# Patient Record
Sex: MALE | Race: WHITE | Employment: OTHER | URBAN - METROPOLITAN AREA
[De-identification: names, ages, dates, MRNs, and addresses within clinical notes are randomized per-mention and may not be internally consistent; named-entity substitution may affect disease eponyms.]

---

## 2017-01-11 ENCOUNTER — NURSE ONLY (OUTPATIENT)
Dept: FAMILY MEDICINE CLINIC | Age: 82
End: 2017-01-11

## 2017-01-11 DIAGNOSIS — E53.8 B12 DEFICIENCY: Primary | ICD-10-CM

## 2017-01-11 PROCEDURE — 96372 THER/PROPH/DIAG INJ SC/IM: CPT | Performed by: FAMILY MEDICINE

## 2017-01-11 RX ORDER — CYANOCOBALAMIN 1000 UG/ML
1000 INJECTION INTRAMUSCULAR; SUBCUTANEOUS ONCE
Status: COMPLETED | OUTPATIENT
Start: 2017-01-11 | End: 2017-01-11

## 2017-01-11 RX ADMIN — CYANOCOBALAMIN 1000 MCG: 1000 INJECTION INTRAMUSCULAR; SUBCUTANEOUS at 14:22

## 2017-01-27 ENCOUNTER — HOSPITAL ENCOUNTER (OUTPATIENT)
Dept: CT IMAGING | Age: 82
Discharge: HOME | End: 2017-01-27
Payer: MEDICARE

## 2017-01-27 ENCOUNTER — HOSPITAL ENCOUNTER (OUTPATIENT)
Dept: ULTRASOUND IMAGING | Age: 82
Discharge: HOME | End: 2017-01-27
Payer: MEDICARE

## 2017-01-27 DIAGNOSIS — F01.50 VASCULAR DEMENTIA WITHOUT BEHAVIORAL DISTURBANCE (HCC): ICD-10-CM

## 2017-01-27 DIAGNOSIS — I65.29 STENOSIS OF CAROTID ARTERY, UNSPECIFIED LATERALITY: Primary | ICD-10-CM

## 2017-01-27 DIAGNOSIS — I77.9 CAROTID DISEASE, BILATERAL (HCC): ICD-10-CM

## 2017-01-27 DIAGNOSIS — R53.1 RIGHT SIDED WEAKNESS: ICD-10-CM

## 2017-01-27 PROCEDURE — 70450 CT HEAD/BRAIN W/O DYE: CPT

## 2017-01-27 PROCEDURE — 93880 EXTRACRANIAL BILAT STUDY: CPT

## 2017-02-07 ENCOUNTER — NURSE ONLY (OUTPATIENT)
Dept: FAMILY MEDICINE CLINIC | Age: 82
End: 2017-02-07

## 2017-02-07 DIAGNOSIS — E53.8 B12 DEFICIENCY: Primary | ICD-10-CM

## 2017-02-07 PROCEDURE — 96372 THER/PROPH/DIAG INJ SC/IM: CPT | Performed by: FAMILY MEDICINE

## 2017-02-07 RX ORDER — CYANOCOBALAMIN 1000 UG/ML
1000 INJECTION INTRAMUSCULAR; SUBCUTANEOUS ONCE
Status: COMPLETED | OUTPATIENT
Start: 2017-02-07 | End: 2017-02-07

## 2017-02-07 RX ADMIN — CYANOCOBALAMIN 1000 MCG: 1000 INJECTION INTRAMUSCULAR; SUBCUTANEOUS at 13:21

## 2017-02-10 RX ORDER — VALSARTAN AND HYDROCHLOROTHIAZIDE 160; 12.5 MG/1; MG/1
TABLET, FILM COATED ORAL
Qty: 90 TABLET | Refills: 1 | Status: SHIPPED | OUTPATIENT
Start: 2017-02-10 | End: 2017-08-09 | Stop reason: SDUPTHER

## 2017-02-10 RX ORDER — CARVEDILOL 6.25 MG/1
TABLET ORAL
Qty: 180 TABLET | Refills: 1 | Status: SHIPPED | OUTPATIENT
Start: 2017-02-10 | End: 2017-08-09 | Stop reason: SDUPTHER

## 2017-02-27 RX ORDER — ALLOPURINOL 100 MG/1
TABLET ORAL
Qty: 180 TABLET | Refills: 1 | Status: SHIPPED | OUTPATIENT
Start: 2017-02-27 | End: 2017-08-26 | Stop reason: SDUPTHER

## 2017-03-13 ENCOUNTER — NURSE ONLY (OUTPATIENT)
Dept: FAMILY MEDICINE CLINIC | Age: 82
End: 2017-03-13

## 2017-03-13 DIAGNOSIS — E53.8 B12 DEFICIENCY: Primary | ICD-10-CM

## 2017-03-13 PROCEDURE — 96372 THER/PROPH/DIAG INJ SC/IM: CPT | Performed by: FAMILY MEDICINE

## 2017-03-13 RX ORDER — CYANOCOBALAMIN 1000 UG/ML
1000 INJECTION INTRAMUSCULAR; SUBCUTANEOUS ONCE
Status: COMPLETED | OUTPATIENT
Start: 2017-03-13 | End: 2017-03-13

## 2017-03-13 RX ADMIN — CYANOCOBALAMIN 1000 MCG: 1000 INJECTION INTRAMUSCULAR; SUBCUTANEOUS at 11:42

## 2017-04-21 RX ORDER — FINASTERIDE 5 MG/1
TABLET, FILM COATED ORAL
Qty: 90 TABLET | Refills: 0 | Status: SHIPPED | OUTPATIENT
Start: 2017-04-21 | End: 2017-07-20 | Stop reason: SDUPTHER

## 2017-05-31 LAB — PROSTATE SPECIFIC ANTIGEN: 7.36 NG/ML (ref 0–6.22)

## 2017-06-01 ENCOUNTER — OFFICE VISIT (OUTPATIENT)
Dept: UROLOGY | Age: 82
End: 2017-06-01

## 2017-06-01 VITALS
SYSTOLIC BLOOD PRESSURE: 138 MMHG | BODY MASS INDEX: 22.44 KG/M2 | HEIGHT: 67 IN | WEIGHT: 143 LBS | HEART RATE: 69 BPM | DIASTOLIC BLOOD PRESSURE: 80 MMHG

## 2017-06-01 DIAGNOSIS — N40.1 BPH WITH OBSTRUCTION/LOWER URINARY TRACT SYMPTOMS: ICD-10-CM

## 2017-06-01 DIAGNOSIS — R97.20 ELEVATED PSA: Primary | ICD-10-CM

## 2017-06-01 DIAGNOSIS — N13.8 BPH WITH OBSTRUCTION/LOWER URINARY TRACT SYMPTOMS: ICD-10-CM

## 2017-06-01 PROCEDURE — 4040F PNEUMOC VAC/ADMIN/RCVD: CPT | Performed by: UROLOGY

## 2017-06-01 PROCEDURE — 1036F TOBACCO NON-USER: CPT | Performed by: UROLOGY

## 2017-06-01 PROCEDURE — G8427 DOCREV CUR MEDS BY ELIG CLIN: HCPCS | Performed by: UROLOGY

## 2017-06-01 PROCEDURE — G8599 NO ASA/ANTIPLAT THER USE RNG: HCPCS | Performed by: UROLOGY

## 2017-06-01 PROCEDURE — G8419 CALC BMI OUT NRM PARAM NOF/U: HCPCS | Performed by: UROLOGY

## 2017-06-01 PROCEDURE — 1123F ACP DISCUSS/DSCN MKR DOCD: CPT | Performed by: UROLOGY

## 2017-06-01 PROCEDURE — 99213 OFFICE O/P EST LOW 20 MIN: CPT | Performed by: UROLOGY

## 2017-06-01 ASSESSMENT — ENCOUNTER SYMPTOMS
ABDOMINAL PAIN: 0
BACK PAIN: 1
ABDOMINAL DISTENTION: 0

## 2017-07-24 RX ORDER — FINASTERIDE 5 MG/1
TABLET, FILM COATED ORAL
Qty: 90 TABLET | Refills: 3 | Status: SHIPPED | OUTPATIENT
Start: 2017-07-24 | End: 2018-07-19 | Stop reason: SDUPTHER

## 2017-08-09 RX ORDER — CARVEDILOL 6.25 MG/1
TABLET ORAL
Qty: 180 TABLET | Refills: 1 | Status: SHIPPED | OUTPATIENT
Start: 2017-08-09 | End: 2018-02-05 | Stop reason: SDUPTHER

## 2017-08-09 RX ORDER — VALSARTAN AND HYDROCHLOROTHIAZIDE 160; 12.5 MG/1; MG/1
TABLET, FILM COATED ORAL
Qty: 90 TABLET | Refills: 1 | Status: SHIPPED | OUTPATIENT
Start: 2017-08-09 | End: 2018-02-05 | Stop reason: SDUPTHER

## 2017-08-28 RX ORDER — ALLOPURINOL 100 MG/1
TABLET ORAL
Qty: 180 TABLET | Refills: 1 | Status: SHIPPED | OUTPATIENT
Start: 2017-08-28 | End: 2018-02-24 | Stop reason: SDUPTHER

## 2017-09-08 ENCOUNTER — NURSE ONLY (OUTPATIENT)
Dept: FAMILY MEDICINE CLINIC | Age: 82
End: 2017-09-08

## 2017-09-08 DIAGNOSIS — D64.9 ANEMIA, UNSPECIFIED TYPE: Primary | ICD-10-CM

## 2017-09-08 PROCEDURE — 96372 THER/PROPH/DIAG INJ SC/IM: CPT | Performed by: FAMILY MEDICINE

## 2017-09-08 RX ORDER — CYANOCOBALAMIN 1000 UG/ML
1000 INJECTION INTRAMUSCULAR; SUBCUTANEOUS ONCE
Status: COMPLETED | OUTPATIENT
Start: 2017-09-08 | End: 2017-09-08

## 2017-09-08 RX ADMIN — CYANOCOBALAMIN 1000 MCG: 1000 INJECTION INTRAMUSCULAR; SUBCUTANEOUS at 12:56

## 2017-11-28 ENCOUNTER — OFFICE VISIT (OUTPATIENT)
Dept: FAMILY MEDICINE CLINIC | Age: 82
End: 2017-11-28

## 2017-11-28 VITALS
WEIGHT: 157 LBS | HEART RATE: 72 BPM | DIASTOLIC BLOOD PRESSURE: 80 MMHG | OXYGEN SATURATION: 99 % | BODY MASS INDEX: 25.23 KG/M2 | TEMPERATURE: 99.2 F | SYSTOLIC BLOOD PRESSURE: 120 MMHG | HEIGHT: 66 IN | RESPIRATION RATE: 14 BRPM

## 2017-11-28 DIAGNOSIS — E78.2 MIXED HYPERLIPIDEMIA: ICD-10-CM

## 2017-11-28 DIAGNOSIS — R73.9 HYPERGLYCEMIA: ICD-10-CM

## 2017-11-28 DIAGNOSIS — I25.10 CORONARY ARTERY DISEASE INVOLVING NATIVE CORONARY ARTERY OF NATIVE HEART WITHOUT ANGINA PECTORIS: ICD-10-CM

## 2017-11-28 DIAGNOSIS — N40.0 BENIGN PROSTATIC HYPERPLASIA WITHOUT LOWER URINARY TRACT SYMPTOMS: ICD-10-CM

## 2017-11-28 DIAGNOSIS — I10 ESSENTIAL HYPERTENSION: Primary | ICD-10-CM

## 2017-11-28 DIAGNOSIS — F03.90 DEMENTIA WITHOUT BEHAVIORAL DISTURBANCE, UNSPECIFIED DEMENTIA TYPE: ICD-10-CM

## 2017-11-28 LAB
ALBUMIN SERPL-MCNC: 3.7 G/DL (ref 3.9–4.9)
ALP BLD-CCNC: 79 U/L (ref 35–104)
ALT SERPL-CCNC: 7 U/L (ref 0–41)
ANION GAP SERPL CALCULATED.3IONS-SCNC: 14 MEQ/L (ref 7–13)
AST SERPL-CCNC: 12 U/L (ref 0–40)
BILIRUB SERPL-MCNC: 0.5 MG/DL (ref 0–1.2)
BUN BLDV-MCNC: 26 MG/DL (ref 8–23)
CALCIUM SERPL-MCNC: 8.3 MG/DL (ref 8.6–10.2)
CHLORIDE BLD-SCNC: 103 MEQ/L (ref 98–107)
CHOLESTEROL, TOTAL: 235 MG/DL (ref 0–199)
CO2: 26 MEQ/L (ref 22–29)
CREAT SERPL-MCNC: 1.5 MG/DL (ref 0.7–1.2)
GFR AFRICAN AMERICAN: 54.5
GFR NON-AFRICAN AMERICAN: 45
GLOBULIN: 2.4 G/DL (ref 2.3–3.5)
GLUCOSE BLD-MCNC: 117 MG/DL (ref 74–109)
HBA1C MFR BLD: 5 % (ref 4.8–5.9)
HCT VFR BLD CALC: 38.9 % (ref 42–52)
HDLC SERPL-MCNC: 44 MG/DL (ref 40–59)
HEMOGLOBIN: 12.6 G/DL (ref 14–18)
LDL CHOLESTEROL CALCULATED: 154 MG/DL (ref 0–129)
MCH RBC QN AUTO: 29.7 PG (ref 27–31.3)
MCHC RBC AUTO-ENTMCNC: 32.4 % (ref 33–37)
MCV RBC AUTO: 91.6 FL (ref 80–100)
PDW BLD-RTO: 16.3 % (ref 11.5–14.5)
PLATELET # BLD: 192 K/UL (ref 130–400)
POTASSIUM SERPL-SCNC: 3.6 MEQ/L (ref 3.5–5.1)
RBC # BLD: 4.24 M/UL (ref 4.7–6.1)
SODIUM BLD-SCNC: 143 MEQ/L (ref 132–144)
TOTAL PROTEIN: 6.1 G/DL (ref 6.4–8.1)
TRIGL SERPL-MCNC: 186 MG/DL (ref 0–200)
WBC # BLD: 7.4 K/UL (ref 4.8–10.8)

## 2017-11-28 PROCEDURE — 1036F TOBACCO NON-USER: CPT | Performed by: FAMILY MEDICINE

## 2017-11-28 PROCEDURE — 1123F ACP DISCUSS/DSCN MKR DOCD: CPT | Performed by: FAMILY MEDICINE

## 2017-11-28 PROCEDURE — 4040F PNEUMOC VAC/ADMIN/RCVD: CPT | Performed by: FAMILY MEDICINE

## 2017-11-28 PROCEDURE — G8484 FLU IMMUNIZE NO ADMIN: HCPCS | Performed by: FAMILY MEDICINE

## 2017-11-28 PROCEDURE — G8419 CALC BMI OUT NRM PARAM NOF/U: HCPCS | Performed by: FAMILY MEDICINE

## 2017-11-28 PROCEDURE — 99214 OFFICE O/P EST MOD 30 MIN: CPT | Performed by: FAMILY MEDICINE

## 2017-11-28 PROCEDURE — G8599 NO ASA/ANTIPLAT THER USE RNG: HCPCS | Performed by: FAMILY MEDICINE

## 2017-11-28 PROCEDURE — G0444 DEPRESSION SCREEN ANNUAL: HCPCS | Performed by: FAMILY MEDICINE

## 2017-11-28 PROCEDURE — G8427 DOCREV CUR MEDS BY ELIG CLIN: HCPCS | Performed by: FAMILY MEDICINE

## 2017-11-28 RX ORDER — MEMANTINE HYDROCHLORIDE 5 MG-10 MG
KIT ORAL
Qty: 1 PACKAGE | Refills: 0 | Status: SHIPPED | OUTPATIENT
Start: 2017-11-28 | End: 2018-01-19 | Stop reason: SDUPTHER

## 2017-11-28 ASSESSMENT — ANXIETY QUESTIONNAIRES: GAD7 TOTAL SCORE: 5

## 2017-11-28 ASSESSMENT — LIFESTYLE VARIABLES
HOW OFTEN DO YOU HAVE A DRINK CONTAINING ALCOHOL: 1
HOW OFTEN DURING THE LAST YEAR HAVE YOU BEEN UNABLE TO REMEMBER WHAT HAPPENED THE NIGHT BEFORE BECAUSE YOU HAD BEEN DRINKING: 0
HOW OFTEN DURING THE LAST YEAR HAVE YOU FOUND THAT YOU WERE NOT ABLE TO STOP DRINKING ONCE YOU HAD STARTED: 0
HOW OFTEN DO YOU HAVE SIX OR MORE DRINKS ON ONE OCCASION: 0
AUDIT TOTAL SCORE: 1
HOW OFTEN DURING THE LAST YEAR HAVE YOU FAILED TO DO WHAT WAS NORMALLY EXPECTED FROM YOU BECAUSE OF DRINKING: 0
HAVE YOU OR SOMEONE ELSE BEEN INJURED AS A RESULT OF YOUR DRINKING: 0
AUDIT-C TOTAL SCORE: 1
HOW OFTEN DURING THE LAST YEAR HAVE YOU HAD A FEELING OF GUILT OR REMORSE AFTER DRINKING: 0
HOW OFTEN DURING THE LAST YEAR HAVE YOU NEEDED AN ALCOHOLIC DRINK FIRST THING IN THE MORNING TO GET YOURSELF GOING AFTER A NIGHT OF HEAVY DRINKING: 0
HAS A RELATIVE, FRIEND, DOCTOR, OR ANOTHER HEALTH PROFESSIONAL EXPRESSED CONCERN ABOUT YOUR DRINKING OR SUGGESTED YOU CUT DOWN: 0
HOW MANY STANDARD DRINKS CONTAINING ALCOHOL DO YOU HAVE ON A TYPICAL DAY: 0

## 2017-11-28 NOTE — PROGRESS NOTES
advised to call us.   phan  Has seen dr Page Napier  Discussed risks, benefits, and alternatives of this medicine and advised to call and discontinue if concerning side effects.   STILL DRIVES THOUGH  I advised against  Home safety addressed    Marianne Bridges MD

## 2018-01-19 RX ORDER — MEMANTINE HYDROCHLORIDE 5 MG/1
10 TABLET ORAL 2 TIMES DAILY
Qty: 120 TABLET | Refills: 3 | Status: SHIPPED | OUTPATIENT
Start: 2018-01-19 | End: 2018-06-12 | Stop reason: SDUPTHER

## 2018-02-05 RX ORDER — VALSARTAN AND HYDROCHLOROTHIAZIDE 160; 12.5 MG/1; MG/1
TABLET, FILM COATED ORAL
Qty: 90 TABLET | Refills: 1 | Status: SHIPPED | OUTPATIENT
Start: 2018-02-05 | End: 2018-08-04 | Stop reason: SDUPTHER

## 2018-02-05 RX ORDER — CARVEDILOL 6.25 MG/1
TABLET ORAL
Qty: 180 TABLET | Refills: 1 | Status: SHIPPED | OUTPATIENT
Start: 2018-02-05 | End: 2018-08-04 | Stop reason: SDUPTHER

## 2018-02-26 RX ORDER — ALLOPURINOL 100 MG/1
TABLET ORAL
Qty: 180 TABLET | Refills: 1 | Status: SHIPPED | OUTPATIENT
Start: 2018-02-26 | End: 2018-08-23 | Stop reason: SDUPTHER

## 2018-05-29 ENCOUNTER — OFFICE VISIT (OUTPATIENT)
Dept: FAMILY MEDICINE CLINIC | Age: 83
End: 2018-05-29
Payer: MEDICARE

## 2018-05-29 VITALS
WEIGHT: 158 LBS | TEMPERATURE: 97.4 F | RESPIRATION RATE: 16 BRPM | BODY MASS INDEX: 25.39 KG/M2 | HEIGHT: 66 IN | HEART RATE: 70 BPM | SYSTOLIC BLOOD PRESSURE: 136 MMHG | DIASTOLIC BLOOD PRESSURE: 64 MMHG

## 2018-05-29 DIAGNOSIS — N18.9 CHRONIC RENAL IMPAIRMENT, UNSPECIFIED CKD STAGE: ICD-10-CM

## 2018-05-29 DIAGNOSIS — I10 ESSENTIAL HYPERTENSION: Primary | ICD-10-CM

## 2018-05-29 DIAGNOSIS — R97.20 ELEVATED PSA: ICD-10-CM

## 2018-05-29 DIAGNOSIS — E78.2 MIXED HYPERLIPIDEMIA: ICD-10-CM

## 2018-05-29 DIAGNOSIS — I65.29 OCCLUSION OF CAROTID ARTERY, UNSPECIFIED LATERALITY: ICD-10-CM

## 2018-05-29 DIAGNOSIS — I10 ESSENTIAL HYPERTENSION: ICD-10-CM

## 2018-05-29 LAB
ALBUMIN SERPL-MCNC: 3.9 G/DL (ref 3.9–4.9)
ALP BLD-CCNC: 74 U/L (ref 35–104)
ALT SERPL-CCNC: 9 U/L (ref 0–41)
ANION GAP SERPL CALCULATED.3IONS-SCNC: 14 MEQ/L (ref 7–13)
AST SERPL-CCNC: 14 U/L (ref 0–40)
BILIRUB SERPL-MCNC: 0.6 MG/DL (ref 0–1.2)
BUN BLDV-MCNC: 32 MG/DL (ref 8–23)
CALCIUM SERPL-MCNC: 8 MG/DL (ref 8.6–10.2)
CHLORIDE BLD-SCNC: 107 MEQ/L (ref 98–107)
CO2: 25 MEQ/L (ref 22–29)
CREAT SERPL-MCNC: 1.58 MG/DL (ref 0.7–1.2)
GFR AFRICAN AMERICAN: 51.2
GFR NON-AFRICAN AMERICAN: 42.4
GLOBULIN: 1.9 G/DL (ref 2.3–3.5)
GLUCOSE BLD-MCNC: 82 MG/DL (ref 74–109)
POTASSIUM SERPL-SCNC: 3.6 MEQ/L (ref 3.5–5.1)
PROSTATE SPECIFIC ANTIGEN: 8.16 NG/ML (ref 0–6.22)
SODIUM BLD-SCNC: 146 MEQ/L (ref 132–144)
TOTAL PROTEIN: 5.8 G/DL (ref 6.4–8.1)

## 2018-05-29 PROCEDURE — G8419 CALC BMI OUT NRM PARAM NOF/U: HCPCS | Performed by: FAMILY MEDICINE

## 2018-05-29 PROCEDURE — 99214 OFFICE O/P EST MOD 30 MIN: CPT | Performed by: FAMILY MEDICINE

## 2018-05-29 PROCEDURE — 4040F PNEUMOC VAC/ADMIN/RCVD: CPT | Performed by: FAMILY MEDICINE

## 2018-05-29 PROCEDURE — G8599 NO ASA/ANTIPLAT THER USE RNG: HCPCS | Performed by: FAMILY MEDICINE

## 2018-05-29 PROCEDURE — G8427 DOCREV CUR MEDS BY ELIG CLIN: HCPCS | Performed by: FAMILY MEDICINE

## 2018-05-29 PROCEDURE — 1036F TOBACCO NON-USER: CPT | Performed by: FAMILY MEDICINE

## 2018-05-29 PROCEDURE — 1123F ACP DISCUSS/DSCN MKR DOCD: CPT | Performed by: FAMILY MEDICINE

## 2018-05-30 LAB
HCT VFR BLD CALC: 38.6 % (ref 42–52)
HEMATOLOGY PATH CONSULT: NO
HEMOGLOBIN: 12.4 G/DL (ref 14–18)
MCH RBC QN AUTO: 30.4 PG (ref 27–31.3)
MCHC RBC AUTO-ENTMCNC: 32.2 % (ref 33–37)
MCV RBC AUTO: 94.5 FL (ref 80–100)
PDW BLD-RTO: 16.1 % (ref 11.5–14.5)
PLATELET # BLD: 73 K/UL (ref 130–400)
PLATELET SLIDE REVIEW: ABNORMAL
RBC # BLD: 4.08 M/UL (ref 4.7–6.1)
SLIDE REVIEW: ABNORMAL
WBC # BLD: 7.8 K/UL (ref 4.8–10.8)

## 2018-06-12 RX ORDER — MEMANTINE HYDROCHLORIDE 5 MG/1
10 TABLET ORAL 2 TIMES DAILY
Qty: 120 TABLET | Refills: 0 | Status: SHIPPED | OUTPATIENT
Start: 2018-06-12 | End: 2018-07-16 | Stop reason: SDUPTHER

## 2018-07-16 RX ORDER — MEMANTINE HYDROCHLORIDE 5 MG/1
10 TABLET ORAL 2 TIMES DAILY
Qty: 120 TABLET | Refills: 5 | Status: SHIPPED | OUTPATIENT
Start: 2018-07-16

## 2018-07-19 RX ORDER — FINASTERIDE 5 MG/1
TABLET, FILM COATED ORAL
Qty: 90 TABLET | Refills: 1 | Status: SHIPPED | OUTPATIENT
Start: 2018-07-19 | End: 2019-01-15 | Stop reason: SDUPTHER

## 2018-08-04 RX ORDER — VALSARTAN AND HYDROCHLOROTHIAZIDE 160; 12.5 MG/1; MG/1
TABLET, FILM COATED ORAL
Qty: 90 TABLET | Refills: 1 | Status: SHIPPED | OUTPATIENT
Start: 2018-08-04 | End: 2019-01-31

## 2018-08-04 RX ORDER — CARVEDILOL 6.25 MG/1
TABLET ORAL
Qty: 180 TABLET | Refills: 1 | Status: SHIPPED | OUTPATIENT
Start: 2018-08-04 | End: 2019-01-31

## 2018-08-24 RX ORDER — ALLOPURINOL 100 MG/1
TABLET ORAL
Qty: 180 TABLET | Refills: 1 | Status: SHIPPED | OUTPATIENT
Start: 2018-08-24 | End: 2018-12-27

## 2018-12-20 LAB
ANION GAP SERPL CALCULATED.3IONS-SCNC: 13 MMOL/L (ref 10–20)
BICARBONATE: 24 MMOL/L (ref 21–32)
BUN / CREAT RATIO: 16 (ref 5–25)
CALCIUM SERPL-MCNC: 8 MG/DL (ref 8.6–10.3)
CHLORIDE BLD-SCNC: 108 MMOL/L (ref 98–107)
CREAT SERPL-MCNC: 1.89 MG/DL (ref 0.5–1.3)
ERYTHROCYTE [DISTWIDTH] IN BLOOD BY AUTOMATED COUNT: 15 % (ref 12–15.4)
ERYTHROCYTE [DISTWIDTH] IN BLOOD BY AUTOMATED COUNT: 52.6 FL (ref 39.3–48.6)
GFR CALCULATED: 34
GLUCOSE: 95 MG/DL (ref 70–100)
HCT VFR BLD CALC: 33.9 % (ref 38.4–54.9)
HEMOGLOBIN: 10.7 G/DL (ref 12.8–17.7)
MCH RBC QN AUTO: 30.1 PG (ref 27.5–32.9)
MCHC RBC AUTO-ENTMCNC: 31.6 G/DL (ref 30.5–35.4)
MCV RBC AUTO: 95.5 FL (ref 83.3–98.2)
NUCLEATED RBCS: 0 /100{WBCS}
PLATELET # BLD: 136 10*3/UL (ref 155–404)
PMV BLD AUTO: 10.7 FL (ref 9.9–12.1)
POTASSIUM SERPL-SCNC: 3.7 MMOL/L (ref 3.5–5.1)
RBC: 3.55 10*6/UL (ref 4.08–6.37)
RBCS COUNTED: 0 10*3/UL
SODIUM BLD-SCNC: 141 MMOL/L (ref 136–145)
UREA NITROGEN: 31 MG/DL (ref 6–23)
WBC: 6.7 10*3/UL (ref 4.2–11)

## 2018-12-21 LAB — C DIFF TOXIN B: NEGATIVE

## 2018-12-22 LAB
ANION GAP SERPL CALCULATED.3IONS-SCNC: 10 MMOL/L (ref 10–20)
BASOPHILS # BLD: 0.4 % (ref 0–1.3)
BASOPHILS ABSOLUTE: 0.02 10*3/UL (ref 0.01–0.09)
BICARBONATE: 24 MMOL/L (ref 21–32)
BUN / CREAT RATIO: 14 (ref 5–25)
CALCIUM SERPL-MCNC: 7.8 MG/DL (ref 8.6–10.3)
CHLORIDE BLD-SCNC: 111 MMOL/L (ref 98–107)
CREAT SERPL-MCNC: 1.87 MG/DL (ref 0.5–1.3)
EOSINOPHIL # BLD: 1.5 % (ref 0–6.7)
EOSINOPHILS ABSOLUTE: 0.07 10*3/UL (ref 0.01–0.46)
ERYTHROCYTE [DISTWIDTH] IN BLOOD BY AUTOMATED COUNT: 15.4 % (ref 12–15.4)
ERYTHROCYTE [DISTWIDTH] IN BLOOD BY AUTOMATED COUNT: 53.2 FL (ref 39.3–48.6)
GFR CALCULATED: 35
GLUCOSE: 93 MG/DL (ref 70–100)
HCT VFR BLD CALC: 33 % (ref 38.4–54.9)
HEMOGLOBIN: 10.4 G/DL (ref 12.8–17.7)
IMMATURE GRANS (ABS): 0.01 10*3/UL (ref 0–0.21)
IMMATURE GRANULOCYTES: 0.2 %
LYMPHOCYTES # BLD: 10.7 % (ref 9.4–41.1)
LYMPHOCYTES ABSOLUTE: 0.49 10*3/UL (ref 0.4–2.84)
MCH RBC QN AUTO: 29.7 PG (ref 27.5–32.9)
MCHC RBC AUTO-ENTMCNC: 31.5 G/DL (ref 30.5–35.4)
MCV RBC AUTO: 94.3 FL (ref 83.3–98.2)
MONOCYTES # BLD: 13.2 % (ref 3–16.2)
MONOCYTES ABSOLUTE: 0.6 10*3/UL (ref 0.25–1.33)
NEUTROPHILS ABSOLUTE: 3.37 10*3/UL (ref 2.22–7.53)
NEUTROPHILS: 74 % (ref 46.2–79.1)
NUCLEATED RBCS: 0 /100{WBCS}
PLATELET # BLD: 111 10*3/UL (ref 155–404)
PMV BLD AUTO: 11.1 FL (ref 9.9–12.1)
POTASSIUM SERPL-SCNC: 3.5 MMOL/L (ref 3.5–5.1)
RBC: 3.5 10*6/UL (ref 4.08–6.37)
RBCS COUNTED: 0 10*3/UL
SODIUM BLD-SCNC: 141 MMOL/L (ref 136–145)
UREA NITROGEN: 26 MG/DL (ref 6–23)
WBC: 4.6 10*3/UL (ref 4.2–11)

## 2018-12-27 DIAGNOSIS — N41.9 PROSTATITIS, UNSPECIFIED PROSTATITIS TYPE: Primary | ICD-10-CM

## 2018-12-27 DIAGNOSIS — N20.0 KIDNEY STONE: ICD-10-CM

## 2018-12-27 DIAGNOSIS — K63.9 COLON WALL THICKENING: ICD-10-CM

## 2018-12-27 RX ORDER — ALLOPURINOL 100 MG/1
100 TABLET ORAL 2 TIMES DAILY
COMMUNITY

## 2018-12-27 RX ORDER — QUETIAPINE FUMARATE 25 MG/1
25 TABLET, FILM COATED ORAL DAILY
COMMUNITY

## 2018-12-27 RX ORDER — TAMSULOSIN HYDROCHLORIDE 0.4 MG/1
0.4 CAPSULE ORAL DAILY
COMMUNITY
End: 2019-01-31

## 2018-12-27 RX ORDER — CIPROFLOXACIN 500 MG/1
500 TABLET, FILM COATED ORAL 2 TIMES DAILY
COMMUNITY
Start: 2018-12-26 | End: 2018-12-29

## 2018-12-28 ENCOUNTER — TELEPHONE (OUTPATIENT)
Dept: FAMILY MEDICINE CLINIC | Age: 83
End: 2018-12-28

## 2018-12-28 ENCOUNTER — OFFICE VISIT (OUTPATIENT)
Dept: GERIATRIC MEDICINE | Age: 83
End: 2018-12-28
Payer: MEDICARE

## 2018-12-28 DIAGNOSIS — N40.0 BENIGN PROSTATIC HYPERPLASIA WITHOUT LOWER URINARY TRACT SYMPTOMS: ICD-10-CM

## 2018-12-28 DIAGNOSIS — R53.1 WEAKNESS: ICD-10-CM

## 2018-12-28 DIAGNOSIS — F03.90 DEMENTIA WITHOUT BEHAVIORAL DISTURBANCE, UNSPECIFIED DEMENTIA TYPE: Primary | ICD-10-CM

## 2018-12-28 DIAGNOSIS — M15.9 OSTEOARTHRITIS OF MULTIPLE JOINTS, UNSPECIFIED OSTEOARTHRITIS TYPE: ICD-10-CM

## 2018-12-28 DIAGNOSIS — I10 ESSENTIAL HYPERTENSION: ICD-10-CM

## 2018-12-28 PROCEDURE — 1101F PT FALLS ASSESS-DOCD LE1/YR: CPT | Performed by: INTERNAL MEDICINE

## 2018-12-28 PROCEDURE — 1123F ACP DISCUSS/DSCN MKR DOCD: CPT | Performed by: INTERNAL MEDICINE

## 2018-12-28 PROCEDURE — G8484 FLU IMMUNIZE NO ADMIN: HCPCS | Performed by: INTERNAL MEDICINE

## 2018-12-28 PROCEDURE — 99305 1ST NF CARE MODERATE MDM 35: CPT | Performed by: INTERNAL MEDICINE

## 2019-01-01 ENCOUNTER — OFFICE VISIT (OUTPATIENT)
Dept: GERIATRIC MEDICINE | Age: 84
End: 2019-01-01
Payer: MEDICARE

## 2019-01-01 VITALS
OXYGEN SATURATION: 98 % | HEART RATE: 76 BPM | WEIGHT: 136 LBS | DIASTOLIC BLOOD PRESSURE: 70 MMHG | BODY MASS INDEX: 21.95 KG/M2 | SYSTOLIC BLOOD PRESSURE: 110 MMHG | RESPIRATION RATE: 16 BRPM | TEMPERATURE: 98.4 F

## 2019-01-01 VITALS
RESPIRATION RATE: 18 BRPM | WEIGHT: 144 LBS | SYSTOLIC BLOOD PRESSURE: 107 MMHG | DIASTOLIC BLOOD PRESSURE: 67 MMHG | BODY MASS INDEX: 23.24 KG/M2 | TEMPERATURE: 98.7 F | HEART RATE: 57 BPM | OXYGEN SATURATION: 97 %

## 2019-01-01 VITALS
TEMPERATURE: 97.8 F | DIASTOLIC BLOOD PRESSURE: 76 MMHG | RESPIRATION RATE: 19 BRPM | WEIGHT: 169.6 LBS | OXYGEN SATURATION: 94 % | SYSTOLIC BLOOD PRESSURE: 154 MMHG | HEART RATE: 80 BPM | BODY MASS INDEX: 27.37 KG/M2

## 2019-01-01 VITALS
TEMPERATURE: 101 F | BODY MASS INDEX: 22.6 KG/M2 | RESPIRATION RATE: 18 BRPM | OXYGEN SATURATION: 94 % | SYSTOLIC BLOOD PRESSURE: 132 MMHG | WEIGHT: 140 LBS | DIASTOLIC BLOOD PRESSURE: 58 MMHG | HEART RATE: 65 BPM

## 2019-01-01 VITALS
HEART RATE: 61 BPM | SYSTOLIC BLOOD PRESSURE: 135 MMHG | BODY MASS INDEX: 23.08 KG/M2 | DIASTOLIC BLOOD PRESSURE: 70 MMHG | OXYGEN SATURATION: 98 % | RESPIRATION RATE: 16 BRPM | WEIGHT: 143 LBS | TEMPERATURE: 96.3 F

## 2019-01-01 VITALS — HEART RATE: 82 BPM | DIASTOLIC BLOOD PRESSURE: 70 MMHG | SYSTOLIC BLOOD PRESSURE: 124 MMHG | TEMPERATURE: 96.1 F

## 2019-01-01 DIAGNOSIS — F03.90 DEMENTIA WITHOUT BEHAVIORAL DISTURBANCE, UNSPECIFIED DEMENTIA TYPE: ICD-10-CM

## 2019-01-01 DIAGNOSIS — R62.7 ADULT FAILURE TO THRIVE: Primary | ICD-10-CM

## 2019-01-01 DIAGNOSIS — I10 ESSENTIAL HYPERTENSION: ICD-10-CM

## 2019-01-01 DIAGNOSIS — I10 ESSENTIAL HYPERTENSION: Primary | ICD-10-CM

## 2019-01-01 DIAGNOSIS — N18.9 CHRONIC KIDNEY DISEASE, UNSPECIFIED CKD STAGE: ICD-10-CM

## 2019-01-01 DIAGNOSIS — F03.90 DEMENTIA WITHOUT BEHAVIORAL DISTURBANCE, UNSPECIFIED DEMENTIA TYPE: Primary | ICD-10-CM

## 2019-01-01 DIAGNOSIS — M15.9 OSTEOARTHRITIS OF MULTIPLE JOINTS, UNSPECIFIED OSTEOARTHRITIS TYPE: ICD-10-CM

## 2019-01-01 DIAGNOSIS — R53.1 WEAKNESS: ICD-10-CM

## 2019-01-01 DIAGNOSIS — R62.7 FAILURE TO THRIVE IN ADULT: ICD-10-CM

## 2019-01-01 DIAGNOSIS — N18.4 CKD (CHRONIC KIDNEY DISEASE) STAGE 4, GFR 15-29 ML/MIN (HCC): ICD-10-CM

## 2019-01-01 DIAGNOSIS — M15.9 OSTEOARTHRITIS OF MULTIPLE JOINTS, UNSPECIFIED OSTEOARTHRITIS TYPE: Primary | ICD-10-CM

## 2019-01-01 DIAGNOSIS — F32.A DEPRESSION, UNSPECIFIED DEPRESSION TYPE: ICD-10-CM

## 2019-01-01 DIAGNOSIS — G47.00 INSOMNIA, UNSPECIFIED TYPE: ICD-10-CM

## 2019-01-01 DIAGNOSIS — N18.30 CKD (CHRONIC KIDNEY DISEASE) STAGE 3, GFR 30-59 ML/MIN (HCC): Primary | ICD-10-CM

## 2019-01-01 DIAGNOSIS — R62.7 ADULT FAILURE TO THRIVE: ICD-10-CM

## 2019-01-01 PROCEDURE — 1123F ACP DISCUSS/DSCN MKR DOCD: CPT | Performed by: NURSE PRACTITIONER

## 2019-01-01 PROCEDURE — 99308 SBSQ NF CARE LOW MDM 20: CPT | Performed by: NURSE PRACTITIONER

## 2019-01-01 PROCEDURE — 99309 SBSQ NF CARE MODERATE MDM 30: CPT | Performed by: INTERNAL MEDICINE

## 2019-01-01 PROCEDURE — 1123F ACP DISCUSS/DSCN MKR DOCD: CPT | Performed by: INTERNAL MEDICINE

## 2019-01-01 PROCEDURE — G8484 FLU IMMUNIZE NO ADMIN: HCPCS | Performed by: INTERNAL MEDICINE

## 2019-01-01 PROCEDURE — 99309 SBSQ NF CARE MODERATE MDM 30: CPT | Performed by: NURSE PRACTITIONER

## 2019-01-03 ENCOUNTER — OFFICE VISIT (OUTPATIENT)
Dept: GERIATRIC MEDICINE | Age: 84
End: 2019-01-03
Payer: MEDICARE

## 2019-01-03 DIAGNOSIS — R50.9 FEVER, UNSPECIFIED FEVER CAUSE: ICD-10-CM

## 2019-01-03 DIAGNOSIS — F03.90 DEMENTIA WITHOUT BEHAVIORAL DISTURBANCE, UNSPECIFIED DEMENTIA TYPE: ICD-10-CM

## 2019-01-03 DIAGNOSIS — I10 ESSENTIAL HYPERTENSION: Primary | ICD-10-CM

## 2019-01-03 PROCEDURE — G8484 FLU IMMUNIZE NO ADMIN: HCPCS | Performed by: NURSE PRACTITIONER

## 2019-01-03 PROCEDURE — 99309 SBSQ NF CARE MODERATE MDM 30: CPT | Performed by: NURSE PRACTITIONER

## 2019-01-03 PROCEDURE — 1101F PT FALLS ASSESS-DOCD LE1/YR: CPT | Performed by: NURSE PRACTITIONER

## 2019-01-03 PROCEDURE — 1123F ACP DISCUSS/DSCN MKR DOCD: CPT | Performed by: NURSE PRACTITIONER

## 2019-01-08 ENCOUNTER — OFFICE VISIT (OUTPATIENT)
Dept: GERIATRIC MEDICINE | Age: 84
End: 2019-01-08
Payer: MEDICARE

## 2019-01-08 DIAGNOSIS — R33.9 URINARY RETENTION: ICD-10-CM

## 2019-01-08 PROCEDURE — 1123F ACP DISCUSS/DSCN MKR DOCD: CPT | Performed by: NURSE PRACTITIONER

## 2019-01-08 PROCEDURE — G8484 FLU IMMUNIZE NO ADMIN: HCPCS | Performed by: NURSE PRACTITIONER

## 2019-01-08 PROCEDURE — 99309 SBSQ NF CARE MODERATE MDM 30: CPT | Performed by: NURSE PRACTITIONER

## 2019-01-08 PROCEDURE — 1101F PT FALLS ASSESS-DOCD LE1/YR: CPT | Performed by: NURSE PRACTITIONER

## 2019-01-15 RX ORDER — FINASTERIDE 5 MG/1
TABLET, FILM COATED ORAL
Qty: 90 TABLET | Refills: 3 | Status: SHIPPED | OUTPATIENT
Start: 2019-01-15 | End: 2019-01-31

## 2019-01-17 ENCOUNTER — OFFICE VISIT (OUTPATIENT)
Dept: GERIATRIC MEDICINE | Age: 84
End: 2019-01-17
Payer: MEDICARE

## 2019-01-17 DIAGNOSIS — G93.41 METABOLIC ENCEPHALOPATHY: ICD-10-CM

## 2019-01-17 PROCEDURE — 1123F ACP DISCUSS/DSCN MKR DOCD: CPT | Performed by: NURSE PRACTITIONER

## 2019-01-17 PROCEDURE — 99309 SBSQ NF CARE MODERATE MDM 30: CPT | Performed by: NURSE PRACTITIONER

## 2019-01-17 PROCEDURE — G8484 FLU IMMUNIZE NO ADMIN: HCPCS | Performed by: NURSE PRACTITIONER

## 2019-01-17 PROCEDURE — 1101F PT FALLS ASSESS-DOCD LE1/YR: CPT | Performed by: NURSE PRACTITIONER

## 2019-01-18 DIAGNOSIS — N28.9 ACUTE RENAL INSUFFICIENCY: Primary | ICD-10-CM

## 2019-01-18 LAB
AMMONIA: 34 UMOL/L (ref 16–60)
ANION GAP SERPL CALCULATED.3IONS-SCNC: 17 MEQ/L (ref 7–13)
BUN BLDV-MCNC: 78 MG/DL (ref 8–23)
CALCIUM SERPL-MCNC: 8.2 MG/DL (ref 8.6–10.2)
CHLORIDE BLD-SCNC: 105 MEQ/L (ref 98–107)
CO2: 19 MEQ/L (ref 22–29)
CREAT SERPL-MCNC: 3.73 MG/DL (ref 0.7–1.2)
GFR AFRICAN AMERICAN: 19
GFR NON-AFRICAN AMERICAN: 15.7
GLUCOSE BLD-MCNC: 99 MG/DL (ref 74–109)
HCT VFR BLD CALC: 28.6 % (ref 42–52)
HEMOGLOBIN: 9.5 G/DL (ref 14–18)
MCH RBC QN AUTO: 30 PG (ref 27–31.3)
MCHC RBC AUTO-ENTMCNC: 33.1 % (ref 33–37)
MCV RBC AUTO: 90.9 FL (ref 80–100)
PDW BLD-RTO: 16.2 % (ref 11.5–14.5)
PLATELET # BLD: 158 K/UL (ref 130–400)
POTASSIUM SERPL-SCNC: 4.3 MEQ/L (ref 3.5–5.1)
RBC # BLD: 3.15 M/UL (ref 4.7–6.1)
SODIUM BLD-SCNC: 141 MEQ/L (ref 132–144)
WBC # BLD: 9.7 K/UL (ref 4.8–10.8)

## 2019-01-21 LAB
BACTERIA: ABNORMAL /HPF
BILIRUBIN URINE: NEGATIVE
BLOOD, URINE: ABNORMAL
CLARITY: ABNORMAL
COLOR: YELLOW
EPITHELIAL CELLS, UA: ABNORMAL /HPF (ref 0–5)
GLUCOSE URINE: NEGATIVE MG/DL
HYALINE CASTS: ABNORMAL /HPF (ref 0–5)
KETONES, URINE: NEGATIVE MG/DL
LEUKOCYTE ESTERASE, URINE: ABNORMAL
NITRITE, URINE: NEGATIVE
PH UA: 5 (ref 5–9)
PROTEIN UA: ABNORMAL MG/DL
RBC UA: ABNORMAL /HPF (ref 0–2)
SPECIFIC GRAVITY UA: 1.02 (ref 1–1.03)
UROBILINOGEN, URINE: 0.2 E.U./DL
WBC UA: >100 /HPF (ref 0–5)

## 2019-01-22 DIAGNOSIS — N28.9 ACUTE RENAL INSUFFICIENCY: Primary | ICD-10-CM

## 2019-01-22 DIAGNOSIS — N28.9 RENAL INSUFFICIENCY: Primary | ICD-10-CM

## 2019-01-22 LAB
ANION GAP SERPL CALCULATED.3IONS-SCNC: 15 MEQ/L (ref 7–13)
BASOPHILS ABSOLUTE: 0 K/UL (ref 0–0.2)
BASOPHILS RELATIVE PERCENT: 0.4 %
BUN BLDV-MCNC: 104 MG/DL (ref 8–23)
CALCIUM SERPL-MCNC: 7.8 MG/DL (ref 8.6–10.2)
CHLORIDE BLD-SCNC: 107 MEQ/L (ref 98–107)
CO2: 18 MEQ/L (ref 22–29)
CREAT SERPL-MCNC: 4.28 MG/DL (ref 0.7–1.2)
EOSINOPHILS ABSOLUTE: 0.3 K/UL (ref 0–0.7)
EOSINOPHILS RELATIVE PERCENT: 3.8 %
GFR AFRICAN AMERICAN: 16.2
GFR NON-AFRICAN AMERICAN: 13.4
GLUCOSE BLD-MCNC: 96 MG/DL (ref 74–109)
HCT VFR BLD CALC: 27.1 % (ref 42–52)
HEMOGLOBIN: 9.1 G/DL (ref 14–18)
LYMPHOCYTES ABSOLUTE: 0.4 K/UL (ref 1–4.8)
LYMPHOCYTES RELATIVE PERCENT: 5.3 %
MCH RBC QN AUTO: 30.6 PG (ref 27–31.3)
MCHC RBC AUTO-ENTMCNC: 33.6 % (ref 33–37)
MCV RBC AUTO: 91.2 FL (ref 80–100)
MONOCYTES ABSOLUTE: 0.6 K/UL (ref 0.2–0.8)
MONOCYTES RELATIVE PERCENT: 7 %
NEUTROPHILS ABSOLUTE: 6.8 K/UL (ref 1.4–6.5)
NEUTROPHILS RELATIVE PERCENT: 83.5 %
PDW BLD-RTO: 16.4 % (ref 11.5–14.5)
PLATELET # BLD: 157 K/UL (ref 130–400)
POTASSIUM SERPL-SCNC: 4.1 MEQ/L (ref 3.5–5.1)
RBC # BLD: 2.97 M/UL (ref 4.7–6.1)
SODIUM BLD-SCNC: 140 MEQ/L (ref 132–144)
WBC # BLD: 8.2 K/UL (ref 4.8–10.8)

## 2019-01-23 VITALS
OXYGEN SATURATION: 95 % | DIASTOLIC BLOOD PRESSURE: 72 MMHG | RESPIRATION RATE: 14 BRPM | HEART RATE: 71 BPM | TEMPERATURE: 97.6 F | SYSTOLIC BLOOD PRESSURE: 119 MMHG

## 2019-01-23 LAB
A/G RATIO: 0.9 (ref 0.9–2.4)
ALBUMIN: 2.4 G/DL (ref 3.4–5)
ALP BLD-CCNC: 134 U/L (ref 45–117)
ALT SERPL-CCNC: 38 U/L (ref 10–52)
ANION GAP SERPL CALCULATED.3IONS-SCNC: 14 MMOL/L (ref 10–20)
AST SERPL-CCNC: 31 U/L (ref 13–39)
BASOPHILS # BLD: 0.2 % (ref 0–1.3)
BASOPHILS ABSOLUTE: 0.02 10*3/UL (ref 0.01–0.09)
BICARBONATE: 16 MMOL/L (ref 21–32)
BILIRUB SERPL-MCNC: 0.7 MG/DL (ref 0–1.2)
BUN / CREAT RATIO: 29 (ref 5–25)
CALCIUM SERPL-MCNC: 7.3 MG/DL (ref 8.6–10.3)
CHLORIDE BLD-SCNC: 117 MMOL/L (ref 98–107)
CREAT SERPL-MCNC: 2.85 MG/DL (ref 0.5–1.3)
EOSINOPHIL # BLD: 3.4 % (ref 0–6.7)
EOSINOPHILS ABSOLUTE: 0.32 10*3/UL (ref 0.01–0.46)
ERYTHROCYTE [DISTWIDTH] IN BLOOD BY AUTOMATED COUNT: 15.9 % (ref 12–15.4)
ERYTHROCYTE [DISTWIDTH] IN BLOOD BY AUTOMATED COUNT: 55.9 FL (ref 39.3–48.6)
GFR CALCULATED: 21
GLUCOSE: 84 MG/DL (ref 70–100)
HCT VFR BLD CALC: 28.2 % (ref 38.4–54.9)
HEMOGLOBIN: 8.5 G/DL (ref 12.8–17.7)
IMMATURE GRANS (ABS): 0.05 10*3/UL (ref 0–0.21)
IMMATURE GRANULOCYTES: 0.5 %
LYMPHOCYTES # BLD: 5.3 % (ref 9.4–41.1)
LYMPHOCYTES ABSOLUTE: 0.49 10*3/UL (ref 0.4–2.84)
MCH RBC QN AUTO: 29 PG (ref 27.5–32.9)
MCHC RBC AUTO-ENTMCNC: 30.1 G/DL (ref 30.5–35.4)
MCV RBC AUTO: 96.2 FL (ref 83.3–98.2)
MONOCYTES # BLD: 7.3 % (ref 3–16.2)
MONOCYTES ABSOLUTE: 0.68 10*3/UL (ref 0.25–1.33)
NEUTROPHILS ABSOLUTE: 7.77 10*3/UL (ref 2.22–7.53)
NEUTROPHILS: 83.3 % (ref 46.2–79.1)
NUCLEATED RBCS: 0 /100{WBCS}
ORGANISM: ABNORMAL
PLATELET # BLD: 192 10*3/UL (ref 155–404)
PMV BLD AUTO: 11.4 FL (ref 9.9–12.1)
POTASSIUM SERPL-SCNC: 3.8 MMOL/L (ref 3.5–5.1)
RBC: 2.93 10*6/UL (ref 4.08–6.37)
RBCS COUNTED: 0 10*3/UL
SODIUM BLD-SCNC: 143 MMOL/L (ref 136–145)
TOTAL PROTEIN: 5 G/DL (ref 6.4–8.2)
UREA NITROGEN: 84 MG/DL (ref 6–23)
URINE CULTURE, ROUTINE: ABNORMAL
URINE CULTURE, ROUTINE: ABNORMAL
WBC: 9.3 10*3/UL (ref 4.2–11)

## 2019-01-24 LAB — HEMOCCULT STL QL: POSITIVE

## 2019-01-26 LAB
ALLEN TEST: YES
BASE EXCESS ARTERIAL: -11 MMOL/L (ref -3–3)
COLLECTION SITE: ABNORMAL
DEVICE: ABNORMAL
HCO3 ARTERIAL: 13 MMOL/L (ref 22–28)
IONIZED CA: 1.14 MMOL/L (ref 1.13–1.32)
LACTIC ACID: 0.66 MMOL/L (ref 0.5–2)
O2 SAT, ARTERIAL: 93 % (ref 97–100)
PCO2 ARTERIAL: 21 MM[HG] (ref 35–46)
PH ARTERIAL: 7.39 (ref 7.35–7.45)
PO2 ARTERIAL: 64 MM[HG] (ref 80–104)
TECH: ABNORMAL

## 2019-01-27 LAB
BLOOD CULTURE, ROUTINE: NORMAL
CULTURE, BLOOD 2: NORMAL

## 2019-01-28 PROBLEM — R50.9 FEVER: Status: ACTIVE | Noted: 2019-01-03

## 2019-01-28 PROBLEM — F03.90 DEMENTIA (HCC): Status: ACTIVE | Noted: 2019-01-03

## 2019-01-29 LAB
ANION GAP SERPL CALCULATED.3IONS-SCNC: 12 MMOL/L (ref 10–20)
BASOPHILS # BLD: 0.4 % (ref 0–1.3)
BASOPHILS ABSOLUTE: 0.04 10*3/UL (ref 0.01–0.09)
BICARBONATE: 19 MMOL/L (ref 21–32)
BUN / CREAT RATIO: 20 (ref 5–25)
CALCIUM SERPL-MCNC: 7.7 MG/DL (ref 8.6–10.3)
CHLORIDE BLD-SCNC: 118 MMOL/L (ref 98–107)
CREAT SERPL-MCNC: 1.41 MG/DL (ref 0.5–1.3)
EOSINOPHIL # BLD: 1 % (ref 0–6.7)
EOSINOPHILS ABSOLUTE: 0.1 10*3/UL (ref 0.01–0.46)
ERYTHROCYTE [DISTWIDTH] IN BLOOD BY AUTOMATED COUNT: 17.9 % (ref 12–15.4)
ERYTHROCYTE [DISTWIDTH] IN BLOOD BY AUTOMATED COUNT: 60.7 FL (ref 39.3–48.6)
GFR CALCULATED: 48
GLUCOSE: 155 MG/DL (ref 70–100)
HCT VFR BLD CALC: 32.1 % (ref 38.4–54.9)
HEMOGLOBIN: 9.6 G/DL (ref 12.8–17.7)
IMMATURE GRANS (ABS): 0.19 10*3/UL (ref 0–0.21)
IMMATURE GRANULOCYTES: 2 %
LYMPHOCYTES # BLD: 6.3 % (ref 9.4–41.1)
LYMPHOCYTES ABSOLUTE: 0.61 10*3/UL (ref 0.4–2.84)
MAGNESIUM: 2 MG/DL (ref 1.6–2.4)
MCH RBC QN AUTO: 29.2 PG (ref 27.5–32.9)
MCHC RBC AUTO-ENTMCNC: 29.9 G/DL (ref 30.5–35.4)
MCV RBC AUTO: 97.6 FL (ref 83.3–98.2)
MONOCYTES # BLD: 5 % (ref 3–16.2)
MONOCYTES ABSOLUTE: 0.48 10*3/UL (ref 0.25–1.33)
NEUTROPHILS ABSOLUTE: 8.22 10*3/UL (ref 2.22–7.53)
NEUTROPHILS: 85.3 % (ref 46.2–79.1)
NUCLEATED RBCS: 0 /100{WBCS}
PHOSPHORUS: 2.5 MG/DL (ref 2.5–4.9)
PLATELET # BLD: 245 10*3/UL (ref 155–404)
PMV BLD AUTO: 11.5 FL (ref 9.9–12.1)
POTASSIUM SERPL-SCNC: 3.8 MMOL/L (ref 3.5–5.1)
RBC: 3.29 10*6/UL (ref 4.08–6.37)
RBCS COUNTED: 0 10*3/UL
SODIUM BLD-SCNC: 145 MMOL/L (ref 136–145)
UREA NITROGEN: 28 MG/DL (ref 6–23)
WBC: 9.6 10*3/UL (ref 4.2–11)

## 2019-01-31 ENCOUNTER — OFFICE VISIT (OUTPATIENT)
Dept: GERIATRIC MEDICINE | Age: 84
End: 2019-01-31
Payer: MEDICARE

## 2019-01-31 DIAGNOSIS — I10 ESSENTIAL HYPERTENSION: ICD-10-CM

## 2019-01-31 DIAGNOSIS — R53.1 WEAKNESS: ICD-10-CM

## 2019-01-31 DIAGNOSIS — A04.72 CLOSTRIDIUM DIFFICILE DIARRHEA: ICD-10-CM

## 2019-01-31 DIAGNOSIS — F03.90 DEMENTIA WITHOUT BEHAVIORAL DISTURBANCE, UNSPECIFIED DEMENTIA TYPE: Primary | ICD-10-CM

## 2019-01-31 PROCEDURE — G8484 FLU IMMUNIZE NO ADMIN: HCPCS | Performed by: INTERNAL MEDICINE

## 2019-01-31 PROCEDURE — 99304 1ST NF CARE SF/LOW MDM 25: CPT | Performed by: INTERNAL MEDICINE

## 2019-01-31 PROCEDURE — 1101F PT FALLS ASSESS-DOCD LE1/YR: CPT | Performed by: INTERNAL MEDICINE

## 2019-01-31 PROCEDURE — 1123F ACP DISCUSS/DSCN MKR DOCD: CPT | Performed by: INTERNAL MEDICINE

## 2019-01-31 RX ORDER — AMOXICILLIN 500 MG/1
500 CAPSULE ORAL 2 TIMES DAILY
COMMUNITY
Start: 2019-01-29 | End: 2019-02-05

## 2019-01-31 RX ORDER — VANCOMYCIN HYDROCHLORIDE 250 MG/1
250 CAPSULE ORAL 4 TIMES DAILY
COMMUNITY
Start: 2019-01-30 | End: 2019-01-01

## 2019-01-31 RX ORDER — CARVEDILOL 25 MG/1
25 TABLET ORAL 2 TIMES DAILY WITH MEALS
COMMUNITY

## 2019-01-31 RX ORDER — YOHIMBE BARK 500 MG
1 CAPSULE ORAL 2 TIMES DAILY
COMMUNITY

## 2019-01-31 RX ORDER — LANOLIN ALCOHOL/MO/W.PET/CERES
3 CREAM (GRAM) TOPICAL NIGHTLY
COMMUNITY

## 2019-01-31 RX ORDER — AMLODIPINE BESYLATE 5 MG/1
5 TABLET ORAL DAILY
COMMUNITY

## 2019-02-01 PROBLEM — R33.9 URINARY RETENTION: Status: ACTIVE | Noted: 2019-01-08

## 2019-02-01 RX ORDER — CARVEDILOL 6.25 MG/1
TABLET ORAL
Qty: 180 TABLET | Refills: 3 | Status: SHIPPED | OUTPATIENT
Start: 2019-02-01

## 2019-02-01 RX ORDER — VALSARTAN AND HYDROCHLOROTHIAZIDE 160; 12.5 MG/1; MG/1
TABLET, FILM COATED ORAL
Qty: 90 TABLET | Refills: 3 | Status: SHIPPED | OUTPATIENT
Start: 2019-02-01

## 2019-02-04 ENCOUNTER — OFFICE VISIT (OUTPATIENT)
Dept: GERIATRIC MEDICINE | Age: 84
End: 2019-02-04
Payer: MEDICARE

## 2019-02-04 DIAGNOSIS — N39.0 URINARY TRACT INFECTION WITHOUT HEMATURIA, SITE UNSPECIFIED: ICD-10-CM

## 2019-02-04 DIAGNOSIS — A04.72 C. DIFFICILE DIARRHEA: ICD-10-CM

## 2019-02-04 DIAGNOSIS — N18.9 CHRONIC RENAL IMPAIRMENT, UNSPECIFIED CKD STAGE: Primary | ICD-10-CM

## 2019-02-04 PROCEDURE — G8484 FLU IMMUNIZE NO ADMIN: HCPCS | Performed by: NURSE PRACTITIONER

## 2019-02-04 PROCEDURE — 1123F ACP DISCUSS/DSCN MKR DOCD: CPT | Performed by: NURSE PRACTITIONER

## 2019-02-04 PROCEDURE — 1101F PT FALLS ASSESS-DOCD LE1/YR: CPT | Performed by: NURSE PRACTITIONER

## 2019-02-04 PROCEDURE — 99309 SBSQ NF CARE MODERATE MDM 30: CPT | Performed by: NURSE PRACTITIONER

## 2019-02-13 PROBLEM — G93.41 METABOLIC ENCEPHALOPATHY: Status: ACTIVE | Noted: 2019-01-17

## 2019-02-25 PROBLEM — N39.0 UTI (URINARY TRACT INFECTION): Status: ACTIVE | Noted: 2019-02-04

## 2019-02-25 PROBLEM — A04.72 C. DIFFICILE DIARRHEA: Status: ACTIVE | Noted: 2019-02-04

## 2019-03-27 PROBLEM — N39.0 UTI (URINARY TRACT INFECTION): Status: RESOLVED | Noted: 2019-02-04 | Resolved: 2019-01-01

## 2019-04-05 NOTE — PROGRESS NOTES
PATIENT: Mathew Rocha : 1965 DOS: 2019     Seen for routine monthly visit for adult failure to thrive, dementia, weakness, insomnia. MEDICATIONS:  Reviewed. SUBJECTIVE:  This patient is clinically stable. The patient has ongoing slow decline, has been seen by hospice. The patient has not had any new emesis, fevers, chills. The patient has not had any new change in his bowel habits. The patient has ongoing decline in terms of his functional capabilities as well. The patient's oral intake has been declining. OBJECTIVE:  She appeared chronically ill. Pupils are small, minimally reactive. Oral mucosa is moist.  Chest showed diminished breath sounds. No crackles. No wheezing. Cardiovascular exam showed a regular rate. Abdomen is soft, nontender. Extremities showed trace dorsal pedal pulse. ASSESSMENT AND PLAN:  1. Degenerative joint disease. No new pain crisis. 2.   Adult failure to thrive. The patient remains on hospice service. Continue with conservative management. 3.   Hypertension. The patient is on low-dose calcium channel blocker. We will monitor.          Electronically Signed By: Ari Krishnamurthy M.D. on 2019 12:07:33  ______________________________  Ari Krishnamurthy M.D.  [id number]  D: 2019 14:23:00  T: 2019 10:53:24

## 2019-04-29 NOTE — PROGRESS NOTES
[facility]  Chief Complaint   Patient presents with    1 Month Follow-Up       REASON FOR VISIT: The patient is being seen today for a monthly visit for failure to thrive, dementia without behavioral disturbance and hypertension. SUBJECTIVE: Resident offers no concerns. He is in the advanced stages of his disease process and he is confused. He is also very anxious at the time of my visit. Nursing staff report resident will yell out and has not slept for several days. They would like to know if there is something that they can give him to calm him down. Otherwise, no concerns. FAMILY AND SOCIAL HISTORY: Refer to H&P. Past Medical History:   Diagnosis Date    BPH (benign prostatic hypertrophy)     CAD (coronary artery disease)     Carotid artery occlusion     CRI (chronic renal insufficiency)     Duodenitis without mention of hemorrhage 06/17/2015    DR ALBERTO, ulcer    Gout     Hyperlipidemia     Hypertension     MI [facility]my[facility] infarction) ([facility] [address]     Normal cardiac stress test 2011,2013    Other specified gastritis without mention of hemorrhage 06/17/2015    DR ALBERTO       MEDICATIONS AND ALLERGIES: Reviewed on chart at nursing facility. REVIEW OF SYSTEMS: Cannot be obtained due to resident's cognition level. PHYSICAL EXAMINATION: Most recent vital signs /67, temp 98.7, heart rate 57, respirations 18, pulse oxing 97% on room air. Weight 144. CONSTITUTIONAL: Resident is alert, elderly, confused male, anxious at his baseline, in no apparent distress. INTEGUMENT: Pink, warm, dry. HEENT: Normocephalic, atraumatic. Hard of hearing. Conjunctivae pink. Sclerae nonicteric. Mucous membranes pink, moist. No oropharyngeal exudates. NECK: Supple. No JVD noted. CV: RRR. No MGR. RESPIRATORY: LSCTA. Respirations even, unlabored. ABDOMEN: Soft, NT, ND. Normoactive bowel sounds. PV: Peripheral pulses present. He does have 1+ pitting pedal edema. ASSESSMENT AND PLAN:   1. Failure to thrive. Resident is down 28 pounds since 01/30. He will continue to follow with hospice. 2. Dementia with behavioral disturbance. Patient very anxious and is in constant motion. Nursing staff report patient has not slept for several days, which they believe is adding to his anxiety/agitation. We will start him on Haldol 0.5 mg t.i.d. p.r.n., hold for excessive agitation and continue the resident's Namenda. I have advised nursing staff to notify the hospice team about the patients agitation/anxiety and our new order. 3. Hypertension. We will continue the resident's carvedilol and amlodipine as ordered. I have reviewed this resident's medication and treatment plan as well as most recent lab work. No further changes will be made at this time. Return in about 1 month (around 5/15/2019) for chronic conditions. ________________________    Diana UMANZOR, [address], [address] [place] [address]  Office [phone]  Fax [phone]  Cc.  [facility]

## 2019-05-01 PROBLEM — R62.7 FAILURE TO THRIVE IN ADULT: Status: ACTIVE | Noted: 2019-01-01

## 2019-05-28 NOTE — PROGRESS NOTES
PATIENT: Evelin Briggs : 1935 DOS: 2019     [facility]  Chief Complaint   Patient presents with    1 Month Follow-Up       REASON FOR VISIT: The patient is being seen today for monthly visit for advanced dementia, CKD, and weakness. SUBJECTIVE: Nursing staff report resident remains at his baseline. He is followed by hospice. They offer no concerns. FAMILY AND SOCIAL HISTORY: Refer to H&P. Past Medical History:   Diagnosis Date    BPH (benign prostatic hypertrophy)     CAD (coronary artery disease)     Carotid artery occlusion     CRI (chronic renal insufficiency)     Duodenitis without mention of hemorrhage 2015    DR ALBERTO, ulcer    Gout     Hyperlipidemia     Hypertension     MI ([facility] infarction) ([facility] [address]     Normal cardiac stress test     Other specified gastritis without mention of hemorrhage 2015    DR ALBERTO       MEDICATIONS AND ALLERGIES: Reviewed on chart at nursing facility. REVIEW OF SYSTEMS: Resident denies any headache, dizziness, blurred vision, chest pain, shortness of breath, abdominal pain, nausea, vomiting, or changes in his bowel or bladder habits. He is pleasantly confused and in the advanced stages of his disease process. PHYSICAL EXAMINATION: Most recent vital signs: /70, temp 98.4, heart rate 76, respirations 16, pulse oxing 98% on room air, weight 136. CONSTITUTIONAL: Resident is alert, elderly, confused male, in no apparent distress, pleasant and cooperative with exam. INTEGUMENT: Pink, warm, dry. HEENT: Normocephalic, atraumatic. Hard of hearing. Conjunctivae pink. Sclerae nonicteric. Mucous membranes pink, moist. No oropharyngeal exudates. NECK: Supple. No JVD noted. CV: Regularly irregular. RESPIRATORY: LSCTA. Respirations even, nonlabored. ABDOMEN: Soft, NT, ND. Normoactive bowel sounds. PV: Peripheral pulses present. No edema noted. : Walker to CD draining pale yellow urine. ASSESSMENT AND PLAN:   1. Advanced dementia. Resident's mood is stable. He is in the advanced stages of his disease process. He will continue to follow with hospice. 2.  CKD. Resident does have a chronic indwelling Walker. He is producing urine. 3.  Weakness. Resident is wheelchair dependent and is dependent on nursing staff for all aspects of his care. I have reviewed this resident's medication and treatment plan, as well as, most recent hospice records. We will continue to work with hospice to meet this resident's overall comfort, function, and safety needs. Return in about 1 month (around 6/18/2019) for chronic conditions. Electronically Signed By: Deep Newton. Finesse Henderson CNP on 05/27/2019 23:10:02  ______________________________  Deep Newton.  Finesse Henderson CNP  [id number]  D: 05/27/2019 13:30:49  T: 05/27/2019 18:51:51    cc: - [address]

## 2019-07-26 PROBLEM — Z51.5 HOSPICE CARE: Status: ACTIVE | Noted: 2019-01-01

## 2019-09-24 PROBLEM — G47.00 INSOMNIA: Status: ACTIVE | Noted: 2019-01-01

## 2019-09-24 NOTE — PROGRESS NOTES
PATIENT: Kenny Diaz : 1935 DOS: 2019     [facility]  Chief Complaint   Patient presents with    1 Month Follow-Up    Dementia    Anemia    Hypertension       The patient is being seen today for a monthly visit for dementia with behaviors, essential hypertension, and insomnia. SUBJECTIVE: Nursing staff report resident remains at his baseline. FAMILY AND SOCIAL HISTORY: Refer to H&P. Past Medical History:   Diagnosis Date    BPH (benign prostatic hypertrophy)     CAD (coronary artery disease)     Carotid artery occlusion     CRI (chronic renal insufficiency)     Duodenitis without mention of hemorrhage 2015    DR ALBERTO, ulcer    Gout     Hyperlipidemia     Hypertension     MI (myocardial infarction) ([facility] [address]     Normal cardiac stress test ,    Other specified gastritis without mention of hemorrhage 2015    DR ALBERTO       MEDICATIONS AND ALLERGIES: Reviewed on chart at nursing facility. REVIEW OF SYSTEMS: Cannot be obtained due to resident's cognition level. PHYSICAL EXAMINATION: Most recent vital signs, blood pressure 132/58, temperature 101, heart rate 65, respirations 18, pulse oxing 94% on room air and weight 140. We will have nursing staff recheck his temperature. CONSTITUTIONAL: Resident is alert, elderly confused male, in no apparent distress. Pleasant and cooperative with examination. INTEGUMENT: Pink, warm and dry. He does have a wound to his right forearm. No signs or symptoms of infection. HEENT: Normocephalic, atraumatic, hard of hearing. Conjunctivae pink. Sclerae nonicteric. Mucous membranes pink, moist. No oropharyngeal exudate. NECK: Supple. No cervical or clavicular lymphadenopathy. CV: Regular rate and rhythm. No murmurs, gallops, rubs noted. RESPIRATORY: Lung sounds clear through all fields. Respirations even and nonlabored. ABDOMEN: Soft, nontender, nondistended, normoactive bowel sounds. PV: Peripheral pulses present.  He does have nonpitting bipedal edema. : Walker to CD draining cloudy, yellow urine. ASSESSMENT AND PLAN:   1. Dementia with behaviors. Resident is tolerating his Namenda without incident. 2.  Essential hypertension. Resident's blood pressure is stable. We will continue his amlodipine and carvedilol as ordered. 3.  Insomnia. Resident's melatonin is effective in meeting his needs. I have reviewed this resident's medication and treatment plan as well as most recent lab work. No further changes will be made at this time. Return in about 1 month (around 9/26/2019) for chronic conditions. Electronically Signed By: Jersey Kim. Yvrose Flores CNP on 09/22/2019 16:39:58  ______________________________  Jersey Kim.  SOHAIL [id number]  D: 09/19/2019 07:29:08  T: 09/19/2019 08:30:09    cc: - [address]

## 2020-01-01 ENCOUNTER — OFFICE VISIT (OUTPATIENT)
Dept: GERIATRIC MEDICINE | Age: 85
End: 2020-01-01
Payer: MEDICARE

## 2020-01-01 PROCEDURE — G8484 FLU IMMUNIZE NO ADMIN: HCPCS | Performed by: NURSE PRACTITIONER

## 2020-01-01 PROCEDURE — 1123F ACP DISCUSS/DSCN MKR DOCD: CPT | Performed by: NURSE PRACTITIONER

## 2020-01-01 PROCEDURE — 99308 SBSQ NF CARE LOW MDM 20: CPT | Performed by: NURSE PRACTITIONER

## 2020-02-28 VITALS
OXYGEN SATURATION: 95 % | TEMPERATURE: 98.6 F | RESPIRATION RATE: 18 BRPM | SYSTOLIC BLOOD PRESSURE: 122 MMHG | DIASTOLIC BLOOD PRESSURE: 59 MMHG | BODY MASS INDEX: 20.76 KG/M2 | WEIGHT: 128.6 LBS | HEART RATE: 72 BPM

## 2020-02-28 PROBLEM — I48.91 ATRIAL FIBRILLATION (HCC): Status: ACTIVE | Noted: 2020-02-28

## 2020-02-28 NOTE — PROGRESS NOTES
PATIENT: Opal Sanchez : 1935 DOS: 2020     [facility]  Chief Complaint   Patient presents with    1 Month Follow-Up    Dementia    Hypertension    Atrial Fibrillation       REASON FOR VISIT: The patient is being seen today for monthly visit for dementia with behaviors, essential hypertension and AFib. SUBJECTIVE: Resident is confused at his baseline. Nursing staff reports he has continued to decline and offer no further concerns. FAMILY AND SOCIAL HISTORY: Refer to H&P. Past Medical History:   Diagnosis Date    BPH (benign prostatic hypertrophy)     CAD (coronary artery disease)     Carotid artery occlusion     CRI (chronic renal insufficiency)     Duodenitis without mention of hemorrhage 2015    DR ALBERTO, ulcer    Gout     Hyperlipidemia     Hypertension     MI (myocardial infarction) ([facility] [address]     Normal cardiac stress test ,    Other specified gastritis without mention of hemorrhage 2015    DR ALBERTO       MEDICATIONS AND ALLERGIES: Reviewed on chart at nursing facility. REVIEW OF SYSTEMS: Cannot be obtained due to resident's cognition level. PHYSICAL EXAMINATION: Most recent vital signs: /59, temperature 98.6, heart rate 72, respirations 18, pulse oxing 95% on room air. Weight 128.6. CONSTITUTIONAL: Resident is elderly, confused, lethargic male, in no apparent distress. INTEGUMENT: Pink, warm and dry. HEENT: Normocephalic, atraumatic, hard of hearing. Conjunctivae pink. Sclerae nonicteric. Mucous membranes pink, moist. No oropharyngeal exudate. NECK: Supple. No cervical or clavicular lymphadenopathy. CV: Regular rate and rhythm. No murmurs, gallops, rubs. RESPIRATORY: Lung sounds clear throughout all fields. Respirations even and nonlabored. He is on O2 at 3 L. ABDOMEN: Soft, nontender, nondistended, normoactive bowel sounds. PV: Peripheral pulses present. No edema noted. : Walker to CD draining clear yellow urine.     ASSESSMENT AND